# Patient Record
Sex: FEMALE | Race: WHITE | NOT HISPANIC OR LATINO | Employment: FULL TIME | URBAN - NONMETROPOLITAN AREA
[De-identification: names, ages, dates, MRNs, and addresses within clinical notes are randomized per-mention and may not be internally consistent; named-entity substitution may affect disease eponyms.]

---

## 2022-01-07 NOTE — PROGRESS NOTES
PT Evaluation     Today's date: 2022  Patient name: Dayan Hwang  : 1965  MRN: [id number]  Referring provider: Munir Santillan DO  Dx:   Encounter Diagnosis     ICD-10-CM    1  Unilateral primary osteoarthritis, right knee  M17 11                   Assessment  Assessment details: The patient is a 50 y/o female who presents to PT with diagnosis of R knee pain, PF OA, ITBS and hip girdle weakness  She has complaints of intermittent pain around her kneecap  Her knee ROM is WNL though she has pain at end range for flexion and extension  She demonstrates deficits with decreased strength, decreased flexibility, antalgic gait, decreased balance and proprioception, limited standing tolerance, difficulty with stair negotiation and pain with completing her ADLs and tasks at home  She remains I with all her ADLs though she has pain with completing them  More pain noted with increased time on her feet  She ambulates without AD with slow altaf and decreased weight shift to RLE in stance phase  Intermittent use of neoprene knee braces is noted  She has difficulty with stair negotiation, can go up the steps with reciprocal gait pattern but comes down the steps with non-reciprocal gait  TTP is noted around her patella  Secondary to pain and above deficits she is limited with her overall mobility and function  The patient would benefit from continued PT to address deficits and improve function  Tx to include ROM, stretching, strengthening, modalities, HEP, pt education, postural ed, lifting/body mechanics, neuro re-ed, balance/proprioception Te, MT and equipment        Impairments: abnormal gait, abnormal or restricted ROM, activity intolerance, impaired balance, impaired physical strength, lacks appropriate home exercise program, pain with function and weight-bearing intolerance  Other impairment: decreased flexibility  Functional limitations: difficulty with stair negotiationUnderstanding of Dx/Px/POC: good   Prognosis: good    Goals  STGs:  1  Initiate and complete HEP with verbal cues  2   Decrease R knee pain by > 25% in 4 weeks  3   Improve RLE strength by 1/2 grade in 4 weeks  LTGs:  1  Patient to be I with HEP in 8 weeks  2  Improve R knee ROM to 0-120 degrees with less pain at end range in 8 weeks to improve function  3  Improve RLE strength to 4+ to 5/5 t/o in 8 weeks to improve function  4  Decrease R knee pain to < or = to 1-2/10 with activity in 8 weeks to improve function  5   Patient to ambulate with normalized gait pattern in 8 weeks  6   Stair negotiation is improved to PLOF in 8 weeks  7   Recreational performance is improved to PLOF in 8 weeks  8   ADL performance is improved to PLOF in 8 weeks  9   Work performance is improved to maximal level of function in 8 weeks  Plan  Plan details: Modalities and therapy interventions prn  Patient would benefit from: skilled physical therapy  Planned modality interventions: cryotherapy, thermotherapy: hydrocollator packs, unattended electrical stimulation and ultrasound  Planned therapy interventions: manual therapy, balance, balance/weight bearing training, neuromuscular re-education, patient education, postural training, self care, strengthening, stretching, therapeutic activities, therapeutic exercise, flexibility, gait training and home exercise program  Frequency: 2x week  Duration in weeks: 8  Plan of Care beginning date: 1/12/2022  Plan of Care expiration date: 3/9/2022  Treatment plan discussed with: patient        Subjective Evaluation    History of Present Illness  Mechanism of injury: The patient states that the beginning of November when she stood up from a toilet her R knee "grabbed" her  Then later that night it did it again when she stood up from the couch  As time went on she notes that at times it felt like it would lock on her while walking and going up and down the steps    She had called her PCP - she had x-rays taken   Per patient they showed arthritis  She had then seen her doctor the beginning of December and he told her that her knees "looked okay"  She got a prescription for 800 mg Ibuprofen  She was referred to an orthopedic doctor  She had seen Dr Edson Moore on 22  No further imaging was completed  She notes that doctor had talked about injections but she did not have any done  She was referred to OPPT and she now presents for her evaluation  She will be going back to see the doctor in six weeks for her follow up appointment  She did have prior x-ray of LB and hip last , per patient there is OA in her lower back but hip x-ray was okay  Pain  At best pain ratin  At worst pain rating: 3  Location: R Knee - medial knee, around kneecap    Quality: sharp  Relieving factors: heat and ice  Aggravating factors: stair climbing and walking    Social Support  Steps to enter house: yes  Stairs in house: yes   Lives in: multiple-level home  Lives with: spouse    Employment status: working (Works Full time - [address] work - Sits most of day for work)    Diagnostic Tests  X-ray: abnormal  Patient Goals  Patient goals for therapy: increased motion, decreased pain and increased strength  Patient goal: "To help strengthen my knee and leg, to have less pain "          Objective     Tenderness     Right Knee   Tenderness in the inferior patella, lateral patella, medial patella and superior patella  No tenderness in the patellar tendon and quadriceps tendon  Neurological Testing     Sensation     Knee   Left Knee   Intact: light touch    Right Knee   Intact: light touch     Active Range of Motion   Left Knee   Flexion: 120 degrees   Extension: 0 degrees     Right Knee   Flexion: 120 degrees   Extension: 0 degrees     Additional Active Range of Motion Details  L SLR: 55  R SLR: 55    Pain at end range for R knee flexion and extension        Mobility   Patellar Mobility:     Right Knee   WFL: medial, lateral, superior and inferior    Additional Mobility Details  Pain with superior/inferior patella mobs    Strength/Myotome Testing     Left Knee   Flexion: WFL  Extension: WFL  Quadriceps contraction: good    Right Knee   Flexion: 4-  Extension: 4+  Quadriceps contraction: fair    Tests     Right Knee   Positive patella-femoral grind  Ambulation   Weight-Bearing Status   Assistive device used: none    Ambulation: Level Surfaces   Ambulation without assistive device: independent    Ambulation: Stairs   Ascend stairs: independent  Pattern: reciprocal  Descend stairs: independent  Pattern: non-reciprocal    Additional Stairs Ambulation Details  She has been coming down the steps sideways  Observational Gait   Decreased walking speed and right stance time  Precautions: None       Manuals 1/12       RLE                                Neuro Re-Ed         SLS        Tandem Stance        Sidestepping w/TBand        Monster Walks w/TBand        BOSU Lunges                        Ther Ex        NuStep        HR/TR        SLR x 3        TKE w/TBand        LAQ        Hams Curl w/TBand        QSets        Bridges        SLR        S/L Hip Abd        Clamshells                        Ther Activity        Stepups F/L        Stepdowns        Gait Training                        Modalities        HP/CP prn                           Access Code: [email or web address]  URL: [email or web address]  Date: 01/12/2022  Prepared by: Devika Beverage    Exercises  · Standing Hip Flexion AROM - 1 x daily - 7 x weekly - 1 sets - 10 reps  · Standing Hip Abduction AROM - 1 x daily - 7 x weekly - 1 sets - 10 reps  · Standing Hip Extension with Chair - 1 x daily - 7 x weekly - 1 sets - 10 reps  · Standing Heel Raise with Support - 1 x daily - 7 x weekly - 1 sets - 10 reps  · Seated Long Arc Quad - 1 x daily - 7 x weekly - 1 sets - 10 reps  · Supine Active Straight Leg Raise - 1 x daily - 7 x weekly - 1 sets - 10 reps  · Sidelying Hip Abduction - 1 x daily - 7 x weekly - 1 sets - 10 reps  · Supine Bridge - 1 x daily - 7 x weekly - 1 sets - 10 reps

## 2022-01-12 ENCOUNTER — EVALUATION (OUTPATIENT)
Dept: PHYSICAL THERAPY | Facility: CLINIC | Age: 57
End: 2022-01-12
Payer: COMMERCIAL

## 2022-01-12 DIAGNOSIS — M17.11 UNILATERAL PRIMARY OSTEOARTHRITIS, RIGHT KNEE: Primary | ICD-10-CM

## 2022-01-12 PROCEDURE — 97535 SELF CARE MNGMENT TRAINING: CPT | Performed by: PHYSICAL THERAPIST

## 2022-01-12 PROCEDURE — 97161 PT EVAL LOW COMPLEX 20 MIN: CPT | Performed by: PHYSICAL THERAPIST

## 2022-01-12 NOTE — LETTER
2022    [facility]    Patient: Sanjuana Evans   YOB: 1965   Date of Visit: 2022     Encounter Diagnosis     ICD-10-CM    1  Unilateral primary osteoarthritis, right knee  M17 11        Dear Dr Paulino Fix:    Thank you for your recent referral of Sanjuana Evans  Please review the attached evaluation summary from Nicolasa's recent visit  Please verify that you agree with the plan of care by signing the attached order  If you have any questions or concerns, please do not hesitate to call  I sincerely appreciate the opportunity to share in the care of one of your patients and hope to have another opportunity to work with you in the near future  Sincerely,    Darrion Carballo PT      Referring Provider:      I certify that I have read the below Plan of Care and certify the need for these services furnished under this plan of treatment while under my care  Constantinesapna Rubio DO  [address] [place] [address]  Via Fax: [phone]          PT Evaluation     Today's date: 2022  Patient name: Sanjuana Evans  : 1965  MRN: [id number]  Referring provider: Eddy Fitzpatrick DO  Dx:   Encounter Diagnosis     ICD-10-CM    1  Unilateral primary osteoarthritis, right knee  M17 11                   Assessment  Assessment details: The patient is a 50 y/o female who presents to PT with diagnosis of R knee pain, PF OA, ITBS and hip girdle weakness  She has complaints of intermittent pain around her kneecap  Her knee ROM is WNL though she has pain at end range for flexion and extension  She demonstrates deficits with decreased strength, decreased flexibility, antalgic gait, decreased balance and proprioception, limited standing tolerance, difficulty with stair negotiation and pain with completing her ADLs and tasks at home  She remains I with all her ADLs though she has pain with completing them    More pain noted with increased time on her feet  She ambulates without AD with slow altaf and decreased weight shift to RLE in stance phase  Intermittent use of neoprene knee braces is noted  She has difficulty with stair negotiation, can go up the steps with reciprocal gait pattern but comes down the steps with non-reciprocal gait  TTP is noted around her patella  Secondary to pain and above deficits she is limited with her overall mobility and function  The patient would benefit from continued PT to address deficits and improve function  Tx to include ROM, stretching, strengthening, modalities, HEP, pt education, postural ed, lifting/body mechanics, neuro re-ed, balance/proprioception Te, MT and equipment  Impairments: abnormal gait, abnormal or restricted ROM, activity intolerance, impaired balance, impaired physical strength, lacks appropriate home exercise program, pain with function and weight-bearing intolerance  Other impairment: decreased flexibility  Functional limitations: difficulty with stair negotiationUnderstanding of Dx/Px/POC: good   Prognosis: good    Goals  STGs:  1  Initiate and complete HEP with verbal cues  2   Decrease R knee pain by > 25% in 4 weeks  3   Improve RLE strength by 1/2 grade in 4 weeks  LTGs:  1  Patient to be I with HEP in 8 weeks  2  Improve R knee ROM to 0-120 degrees with less pain at end range in 8 weeks to improve function  3  Improve RLE strength to 4+ to 5/5 t/o in 8 weeks to improve function  4  Decrease R knee pain to < or = to 1-2/10 with activity in 8 weeks to improve function  5   Patient to ambulate with normalized gait pattern in 8 weeks  6   Stair negotiation is improved to PLOF in 8 weeks  7   Recreational performance is improved to PLOF in 8 weeks  8   ADL performance is improved to PLOF in 8 weeks  9   Work performance is improved to maximal level of function in 8 weeks  Plan  Plan details: Modalities and therapy interventions prn      Patient would benefit from: skilled physical therapy  Planned modality interventions: cryotherapy, thermotherapy: hydrocollator packs, unattended electrical stimulation and ultrasound  Planned therapy interventions: manual therapy, balance, balance/weight bearing training, neuromuscular re-education, patient education, postural training, self care, strengthening, stretching, therapeutic activities, therapeutic exercise, flexibility, gait training and home exercise program  Frequency: 2x week  Duration in weeks: 8  Plan of Care beginning date: 2022  Plan of Care expiration date: 3/9/2022  Treatment plan discussed with: patient        Subjective Evaluation    History of Present Illness  Mechanism of injury: The patient states that the beginning of November when she stood up from a toilet her R knee "grabbed" her  Then later that night it did it again when she stood up from the couch  As time went on she notes that at times it felt like it would lock on her while walking and going up and down the steps  She had called her PCP - she had x-rays taken  Per patient they showed arthritis  She had then seen her doctor the beginning of December and he told her that her knees "looked okay"  She got a prescription for 800 mg Ibuprofen  She was referred to an orthopedic doctor  She had seen Dr Chayito Vargas on 22  No further imaging was completed  She notes that doctor had talked about injections but she did not have any done  She was referred to OPPT and she now presents for her evaluation  She will be going back to see the doctor in six weeks for her follow up appointment  She did have prior x-ray of LB and hip last , per patient there is OA in her lower back but hip x-ray was okay      Pain  At best pain ratin  At worst pain rating: 3  Location: R Knee - medial knee, around kneecap    Quality: sharp  Relieving factors: heat and ice  Aggravating factors: stair climbing and walking    Social Support  Steps to enter house: yes  Stairs in house: yes   Lives in: multiple-level home  Lives with: spouse    Employment status: working (Works Full time - [address]  work - Sits most of day for work)    Diagnostic Tests  X-ray: abnormal  Patient Goals  Patient goals for therapy: increased motion, decreased pain and increased strength  Patient goal: "To help strengthen my knee and leg, to have less pain "          Objective     Tenderness     Right Knee   Tenderness in the inferior patella, lateral patella, medial patella and superior patella  No tenderness in the patellar tendon and quadriceps tendon  Neurological Testing     Sensation     Knee   Left Knee   Intact: light touch    Right Knee   Intact: light touch     Active Range of Motion   Left Knee   Flexion: 120 degrees   Extension: 0 degrees     Right Knee   Flexion: 120 degrees   Extension: 0 degrees     Additional Active Range of Motion Details  L SLR: 55  R SLR: 55    Pain at end range for R knee flexion and extension  Mobility   Patellar Mobility:     Right Knee   WFL: medial, lateral, superior and inferior    Additional Mobility Details  Pain with superior/inferior patella mobs    Strength/Myotome Testing     Left Knee   Flexion: WFL  Extension: WFL  Quadriceps contraction: good    Right Knee   Flexion: 4-  Extension: 4+  Quadriceps contraction: fair    Tests     Right Knee   Positive patella-femoral grind  Ambulation   Weight-Bearing Status   Assistive device used: none    Ambulation: Level Surfaces   Ambulation without assistive device: independent    Ambulation: Stairs   Ascend stairs: independent  Pattern: reciprocal  Descend stairs: independent  Pattern: non-reciprocal    Additional Stairs Ambulation Details  She has been coming down the steps sideways  Observational Gait   Decreased walking speed and right stance time                   Precautions: None       Manuals 1/12       RLE                                Neuro Re-Ed         SLS        Tandem Stance        Sidestepping w/TBand        [employer] w/TBand        BOSU Lunges                        Ther Ex        NuStep        HR/TR        SLR x 3        TKE w/TBand        LAQ        Hams Curl w/TBand        QSets        Bridges        SLR        S/L Hip Abd        Clamshells                        Ther Activity        Stepups F/L        Stepdowns        Gait Training                        Modalities        HP/CP prn                           Access Code: [email or web address]  URL: [email or web address]  Date: 01/12/2022  Prepared by: Hannah Moseley    Exercises  · Standing Hip Flexion AROM - 1 x daily - 7 x weekly - 1 sets - 10 reps  · Standing Hip Abduction AROM - 1 x daily - 7 x weekly - 1 sets - 10 reps  · Standing Hip Extension with Chair - 1 x daily - 7 x weekly - 1 sets - 10 reps  · Standing Heel Raise with Support - 1 x daily - 7 x weekly - 1 sets - 10 reps  · Seated Long Arc Quad - 1 x daily - 7 x weekly - 1 sets - 10 reps  · Supine Active Straight Leg Raise - 1 x daily - 7 x weekly - 1 sets - 10 reps  · Sidelying Hip Abduction - 1 x daily - 7 x weekly - 1 sets - 10 reps  · Supine Bridge - 1 x daily - 7 x weekly - 1 sets - 10 reps

## 2022-01-17 ENCOUNTER — APPOINTMENT (OUTPATIENT)
Dept: PHYSICAL THERAPY | Facility: CLINIC | Age: 57
End: 2022-01-17
Payer: COMMERCIAL

## 2022-01-19 ENCOUNTER — OFFICE VISIT (OUTPATIENT)
Dept: PHYSICAL THERAPY | Facility: CLINIC | Age: 57
End: 2022-01-19
Payer: COMMERCIAL

## 2022-01-19 DIAGNOSIS — M17.11 UNILATERAL PRIMARY OSTEOARTHRITIS, RIGHT KNEE: Primary | ICD-10-CM

## 2022-01-19 PROCEDURE — 97140 MANUAL THERAPY 1/> REGIONS: CPT

## 2022-01-19 PROCEDURE — 97112 NEUROMUSCULAR REEDUCATION: CPT

## 2022-01-19 PROCEDURE — 97014 ELECTRIC STIMULATION THERAPY: CPT

## 2022-01-19 PROCEDURE — 97110 THERAPEUTIC EXERCISES: CPT

## 2022-01-19 PROCEDURE — G0283 ELEC STIM OTHER THAN WOUND: HCPCS

## 2022-01-19 NOTE — PROGRESS NOTES
Daily Note     Today's date: 2022  Patient name: Tianna Nicole  : 1965  MRN: [id number]  Referring provider: Matheus Cooper DO  Dx:   Encounter Diagnosis     ICD-10-CM    1  Unilateral primary osteoarthritis, right knee  M17 11        Start Time: 1630  Stop Time: 1740  Total time in clinic (min): 70 minutes    Subjective: Pt continues with R knee tenderness to palpation  Objective: See treatment diary below  PT/PTA expanded pt's program per POC  Assessment: Tolerated treatment well  Patient exhibited good technique with therapeutic exercises      Plan: Continue per plan of care           Precautions: None       Manuals       RLE  Cd/ds              Neuro Re-Ed         SLS  3x 15"      Tandem Stance        Sidestepping w/TBand        Monster Walks w/TBand        BOSU Lunges        Ther Ex        NuStep  5m L3      HR  20x      SLR x 3        TKE w/TBand        LAQ  2/10      Hams Curl w/TBand        QSets  10x       Bridges        SLR  2/10      S/L Hip Abd        Clamshells  L4 2/10                      Ther Activity                        Gait Training                        Modalities        HP/IFC  15m

## 2022-01-24 ENCOUNTER — OFFICE VISIT (OUTPATIENT)
Dept: PHYSICAL THERAPY | Facility: CLINIC | Age: 57
End: 2022-01-24
Payer: COMMERCIAL

## 2022-01-24 DIAGNOSIS — M17.11 UNILATERAL PRIMARY OSTEOARTHRITIS, RIGHT KNEE: Primary | ICD-10-CM

## 2022-01-24 PROCEDURE — 97110 THERAPEUTIC EXERCISES: CPT

## 2022-01-24 PROCEDURE — G0283 ELEC STIM OTHER THAN WOUND: HCPCS

## 2022-01-24 PROCEDURE — 97014 ELECTRIC STIMULATION THERAPY: CPT

## 2022-01-24 PROCEDURE — 97140 MANUAL THERAPY 1/> REGIONS: CPT

## 2022-01-24 PROCEDURE — 97112 NEUROMUSCULAR REEDUCATION: CPT

## 2022-01-24 NOTE — PROGRESS NOTES
Daily Note     Today's date: 2022  Patient name: Sanjuana Evans  : 1965  MRN: [id number]  Referring provider: Eddy Fitzpatrick DO  Dx:   Encounter Diagnosis     ICD-10-CM    1  Unilateral primary osteoarthritis, right knee  M17 11        Start Time: 1630  Stop Time: 1745  Total time in clinic (min): 75 minutes    Subjective: Pt notes less discomfort with her R knee  Objective: See treatment diary below  Continues CP/Etim post tx  Assessment: Tolerated treatment well  Patient exhibited good technique with therapeutic exercises      Plan: Continue per plan of care           Precautions: None       Manuals      RLE  Cd/ds ds             Neuro Re-Ed         SLS  3x 15" 3x 15"     Tandem Stance        Sidestepping w/TBand        Monster Walks w/TBand        Ther Ex        NuStep  5m L3 10m L4     HR  20x 30x     SLR x 3   2/10ea     TKE w/TBand        LAQ  2/10 1 5# 2/10     Hams Curl w/TBand   L3 2/10     QSets  10x  20x 3"     Bridges c add   20x     SLR  2/10 -------->     Clamshells  L4 2/10 L4 2/10                     Ther Activity        Gait Training        Modalities        HP/IFC  15m 15m CP

## 2022-01-27 ENCOUNTER — OFFICE VISIT (OUTPATIENT)
Dept: PHYSICAL THERAPY | Facility: CLINIC | Age: 57
End: 2022-01-27
Payer: COMMERCIAL

## 2022-01-27 DIAGNOSIS — M17.11 UNILATERAL PRIMARY OSTEOARTHRITIS, RIGHT KNEE: Primary | ICD-10-CM

## 2022-01-27 PROCEDURE — 97110 THERAPEUTIC EXERCISES: CPT

## 2022-01-27 PROCEDURE — G0283 ELEC STIM OTHER THAN WOUND: HCPCS

## 2022-01-27 PROCEDURE — 97112 NEUROMUSCULAR REEDUCATION: CPT

## 2022-01-27 PROCEDURE — 97014 ELECTRIC STIMULATION THERAPY: CPT

## 2022-01-27 PROCEDURE — 97140 MANUAL THERAPY 1/> REGIONS: CPT

## 2022-01-27 NOTE — PROGRESS NOTES
Daily Note     Today's date: 2022  Patient name: Mari Hall  : 1965  MRN: [id number]  Referring provider: Selvin Lee DO  Dx:   Encounter Diagnosis     ICD-10-CM    1  Unilateral primary osteoarthritis, right knee  M17 11        Start Time: 1630  Stop Time: 1745  Total time in clinic (min): 75 minutes    Subjective: Pt notes variable level of soreness with activity      Objective: See treatment diary below      Assessment: Tolerated treatment well  Patient exhibited good technique with therapeutic exercises      Plan: Continue per plan of care           Precautions: None       Manuals     RLE  Cd/ds ds ds            Neuro Re-Ed         SLS  3x 15" 3x 15" 3x 15"    Tandem Stance        Sidestepping w/TBand        Monster Walks w/TBand        Ther Ex        NuStep  5m L3 10m L4 10m L5    HR  20x 30x 30x    SLR x 3   2/10ea 2/10    TKE w/TBand        LAQ  2/10 1 5# 2/10 1 5# 2/10    Hams Curl w/TBand   L3 2/10 L4 2/10    QSets  10x  20x 3" 30x 3"    Bridges c add   20x 2/15    SLR  2/10 --------> ----->    Clamshells  L4 2/10 L4 2/10 L4 2/10                    Ther Activity        Gait Training        Modalities        CP/IFC  15m 15m CP 15m

## 2022-02-02 ENCOUNTER — OFFICE VISIT (OUTPATIENT)
Dept: PHYSICAL THERAPY | Facility: CLINIC | Age: 57
End: 2022-02-02
Payer: COMMERCIAL

## 2022-02-02 DIAGNOSIS — M17.11 UNILATERAL PRIMARY OSTEOARTHRITIS, RIGHT KNEE: Primary | ICD-10-CM

## 2022-02-02 PROCEDURE — 97112 NEUROMUSCULAR REEDUCATION: CPT

## 2022-02-02 PROCEDURE — 97140 MANUAL THERAPY 1/> REGIONS: CPT

## 2022-02-02 PROCEDURE — 97110 THERAPEUTIC EXERCISES: CPT

## 2022-02-02 NOTE — PROGRESS NOTES
Daily Note     Today's date: 2022  Patient name: Emilio Conte  : 1965  MRN: [id number]  Referring provider: Ju Lomeli DO  Dx:   Encounter Diagnosis     ICD-10-CM    1  Unilateral primary osteoarthritis, right knee  M17 11        Start Time: 1630  Stop Time: 1730  Total time in clinic (min): 60 minutes    Subjective: Pt  notes overall less discomfort with her R knee  Objective: See treatment diary below  Trial cp only to access benefit of estim      Assessment: Tolerated treatment well  Patient exhibited good technique with therapeutic exercises      Plan: Continue per plan of care           Precautions: None       Manuals  2/   RLE  Cd/ds ds ds ds           Neuro Re-Ed         SLS  3x 15" 3x 15" 3x 15" 3x 15"   Tandem Stance        Sidestepping w/TBand        Monster Walks w/TBand        Ther Ex        NuStep  5m L3 10m L4 10m L5 10m L5   HR  20x 30x 30x 30x    SLR x 3   2/10ea 2/10 2/10   TKE w/TBand        LAQ  2/10 1 5# 2/10 1 5# 2/10 2# 2/10   Hams Curl w/TBand   L3 2/10 L4 2/10 L4 2/10   QSets  10x  20x 3" 30x 3" 30x 3"   Bridges c add   20x 2/15 /15   SLR  2/10 --------> ----->    Clamshells  L4 2/10 L4 2/10 L4 2/10 L5  2/10   SAQ     2# 2/10           Ther Activity        Gait Training        Modalities        CP/IFC  15m 15m CP 15m 15m CP

## 2022-02-07 ENCOUNTER — OFFICE VISIT (OUTPATIENT)
Dept: PHYSICAL THERAPY | Facility: CLINIC | Age: 57
End: 2022-02-07
Payer: COMMERCIAL

## 2022-02-07 DIAGNOSIS — M17.11 UNILATERAL PRIMARY OSTEOARTHRITIS, RIGHT KNEE: Primary | ICD-10-CM

## 2022-02-07 PROCEDURE — 97140 MANUAL THERAPY 1/> REGIONS: CPT

## 2022-02-07 PROCEDURE — 97112 NEUROMUSCULAR REEDUCATION: CPT

## 2022-02-07 PROCEDURE — 97110 THERAPEUTIC EXERCISES: CPT

## 2022-02-07 NOTE — PROGRESS NOTES
Daily Note     Today's date: 2022  Patient name: Myah Mariano  : 1965  MRN: [id number]  Referring provider: Milli Mayberry DO  Dx:   Encounter Diagnosis     ICD-10-CM    1  Unilateral primary osteoarthritis, right knee  M17 11        Start Time: 1630  Stop Time: 1730  Total time in clinic (min): 60 minutes    Subjective: Pt notes min difference with/without the Estim  She generally is sore 2 days after therapy   Objective: See treatment diary below  IFC PRN      Assessment: Tolerated treatment well  Patient exhibited good technique with therapeutic exercises      Plan: Continue per plan of care          Precautions: None        Manuals  2   RLE  ds Cd/ds ds ds ds                 Neuro Re-Ed              SLS  3x 15" 3x 15" 3x 15" 3x 15" 3x 15"   Tandem Stance             Sidestepping w/TBand             Monster Walks w/TBand             Ther Ex             NuStep  10m L5 5m L3 10m L4 10m L5 10m L5   HR 30x 20x 30x 30x 30x    SLR x 3  2/10   2/10ea 2/10 2/10   TKE w/TBand             LAQ  2#  2/10 1 5# 2/10 1 5# 2/10 2# 2/10   Hams Curl w/TBand  L4 2/10   L3 2/10 L4 2/10 L4 2/10   QSets  30x 3" 10x  20x 3" 30x 3" 30x 3"   Bridges c add  2/15   20x 2/15 15   SLR  -----> 2/10 --------> ----->     Supine hip Abd  L5 2/10 L4 2/10 L4 2/10 L4 2/10 L5  2/10   SAQ  2# 2/10       2# 2/10                 Ther Activity             Gait Training             Modalities             CP/IFC  15m CP 15m 15m CP 15m 15m CP

## 2022-02-10 ENCOUNTER — OFFICE VISIT (OUTPATIENT)
Dept: PHYSICAL THERAPY | Facility: CLINIC | Age: 57
End: 2022-02-10
Payer: COMMERCIAL

## 2022-02-10 DIAGNOSIS — M17.11 UNILATERAL PRIMARY OSTEOARTHRITIS, RIGHT KNEE: Primary | ICD-10-CM

## 2022-02-10 PROCEDURE — 97112 NEUROMUSCULAR REEDUCATION: CPT

## 2022-02-10 PROCEDURE — 97035 APP MDLTY 1+ULTRASOUND EA 15: CPT

## 2022-02-10 PROCEDURE — 97110 THERAPEUTIC EXERCISES: CPT

## 2022-02-10 PROCEDURE — 97140 MANUAL THERAPY 1/> REGIONS: CPT

## 2022-02-10 NOTE — PROGRESS NOTES
Daily Note     Today's date: 2/10/2022  Patient name: Keon Haley  : 1965  MRN: [id number]  Referring provider: Bj Sotomayor DO  Dx:   Encounter Diagnosis     ICD-10-CM    1  Unilateral primary osteoarthritis, right knee  M17 11        Start Time: 1630  Stop Time: 1740  Total time in clinic (min): 70 minutes    Subjective: Pt notes continued/variable R knee discomfort; she notes some sensation like it's going to lock  Objective: See treatment diary below  PTA applied US to the R knee medial knee per PT ok  Assessment: Tolerated treatment well  Patient exhibited good technique with therapeutic exercises      Plan: Continue per plan of care        Precautions: None        Manuals 2/7 2/10 1/24 1/27 2/2   RLE  ds ds ds ds ds                 Neuro Re-Ed              SLS  3x 15" 3x 15" 3x 15" 3x 15" 3x 15"   Tandem Stance             Sidestepping w/TBand             Monster Walks w/TBand             Ther Ex             NuStep  10m L5 10m L5 10m L4 10m L5 10m L5   HR 30x 30x 30x 30x 30x    SLR x 3  2/10  2/10 2/10ea 10 2/10   TKE w/TBand             LAQ  2# 2/10 2# 2/10 1 5# 2/10 1 5# 2/10 2# 2/10   Hams Curl w/TBand  L4 2/10  L4 2/10 L3 2/10 L4 2/10 L4 2/10   QSets  30x 3" 30x 3" 20x 3" 30x 3" 30x 3"   Bridges c add  2/15  2/15 20x 2/15 2/15   SLR  -----> 2# 2/10 --------> ----->     Supine hip Abd  L5 2/10 L5 2/10 L4 2/10 L4 2/10 L5  2/10   SAQ  2# 2/10  2# 2/10     2# 2/10                 Ther Activity             Gait Training             Modalities             CP/IFC  15m CP 15m CP 15m CP 15m 15m CP

## 2022-02-14 ENCOUNTER — EVALUATION (OUTPATIENT)
Dept: PHYSICAL THERAPY | Facility: CLINIC | Age: 57
End: 2022-02-14
Payer: COMMERCIAL

## 2022-02-14 DIAGNOSIS — M17.11 UNILATERAL PRIMARY OSTEOARTHRITIS, RIGHT KNEE: Primary | ICD-10-CM

## 2022-02-14 PROCEDURE — 97140 MANUAL THERAPY 1/> REGIONS: CPT

## 2022-02-14 PROCEDURE — 97110 THERAPEUTIC EXERCISES: CPT

## 2022-02-14 PROCEDURE — 97112 NEUROMUSCULAR REEDUCATION: CPT

## 2022-02-14 NOTE — PROGRESS NOTES
PT Re-Evaluation     Today's date: 2022  Patient name: Satish Murrieta  : 1965  MRN: [id number]  Referring provider: Abbie Pace DO  Dx:   Encounter Diagnosis     ICD-10-CM    1  Unilateral primary osteoarthritis, right knee  M17 11                   Assessment  Assessment details: The patient is a 48 y/o female seen for 8 since [facility] for PT with diagnosis of R knee pain, PF OA, ITBS and hip girdle weakness  She has responded well to PT intervention with improved strength and flexibility  She does continue to complain of intermittent pain around her right kneecap  Her knee ROM is WNL  She demonstrates deficits with decreased strength, decreased flexibility, antalgic gait, decreased balance and proprioception, limited standing tolerance, difficulty with stair negotiation and pain with completing her ADLs and tasks at home  She remains I with all her ADLs though she has pain with completing them  She has difficulty with stair negotiation, can go up the steps with reciprocal gait pattern but comes down the steps with non-reciprocal gait  TTP is noted around her patella  Secondary to pain and above deficits she is limited with her overall mobility and function  The patient would benefit from continued PT to address deficits and improve function  Tx to include ROM, stretching, strengthening, modalities, HEP, pt education, postural ed, lifting/body mechanics, neuro re-ed, balance/proprioception Te, MT and equipment  Impairments: abnormal gait, abnormal or restricted ROM, activity intolerance, impaired balance, impaired physical strength, lacks appropriate home exercise program, pain with function and weight-bearing intolerance  Other impairment: decreased flexibility  Functional limitations: difficulty with stair negotiationUnderstanding of Dx/Px/POC: good   Prognosis: good    Goals  STGs:  1  Initiate and complete HEP with verbal cues  -Met  2  Decrease R knee pain by > 25% in 4 weeks   - SPR 1/10 SPR  3  Improve RLE strength by 1/2 grade in 4 weeks  - progressing  LTGs:  1  Patient to be I with HEP in 8 weeks  2  Improve R knee ROM to 0-120 degrees with less pain at end range in 8 weeks to improve function  3  Improve RLE strength to 4+ to 5/5 t/o in 8 weeks to improve function  4  Decrease R knee pain to < or = to 1-2/10 with activity in 8 weeks to improve function  5   Patient to ambulate with normalized gait pattern in 8 weeks  6   Stair negotiation is improved to PLOF in 8 weeks  7   Recreational performance is improved to PLOF in 8 weeks  8   ADL performance is improved to PLOF in 8 weeks  9   Work performance is improved to maximal level of function in 8 weeks  Plan  Plan details: Modalities and therapy interventions prn  Patient would benefit from: skilled physical therapy  Planned modality interventions: cryotherapy, thermotherapy: hydrocollator packs, unattended electrical stimulation and ultrasound  Planned therapy interventions: manual therapy, balance, balance/weight bearing training, neuromuscular re-education, patient education, postural training, self care, strengthening, stretching, therapeutic activities, therapeutic exercise, flexibility, gait training and home exercise program  Frequency: 2x week  Duration in weeks: 4  Plan of Care beginning date: 2/14/2022  Plan of Care expiration date: 3/14/2022  Treatment plan discussed with: patient        Subjective Evaluation    History of Present Illness  Mechanism of injury: The patient states that the beginning of November when she stood up from a toilet her R knee "grabbed" her  Then later that night it did it again when she stood up from the couch  As time went on she notes that at times it felt like it would lock on her while walking and going up and down the steps  She had called her PCP - she had x-rays taken  Per patient they showed arthritis    She had then seen her doctor the beginning of December and he told her that her knees "looked okay"  She got a prescription for 800 mg Ibuprofen  She was referred to an orthopedic doctor  She had seen Dr Joslyn Galvez on 22  No further imaging was completed  She notes that doctor had talked about injections but she did not have any done  She was referred to OPPT and she now presents for her evaluation  She will be going back to see the doctor in six weeks for her follow up appointment  She did have prior x-ray of LB and hip last , per patient there is OA in her lower back but hip x-ray was okay  UPDATE 22:  Patient notes that soreness in the bilateral hips and knee persist however she does feel she has made improvement with PT intervention with less pain and stiffness in the right knee  Pain  At best pain ratin  At worst pain rating: 3  Location: R Knee - medial knee, around kneecap    Quality: sharp  Relieving factors: heat and ice  Aggravating factors: stair climbing and walking    Social Support  Steps to enter house: yes  Stairs in house: yes   Lives in: multiple-level home  Lives with: spouse    Employment status: working (Works Full time - [address] [place] [address] work - Sits most of day for work)    Diagnostic Tests  X-ray: abnormal  Patient Goals  Patient goals for therapy: increased motion, decreased pain and increased strength  Patient goal: "To help strengthen my knee and leg, to have less pain "          Objective     Tenderness     Right Knee   Tenderness in the inferior patella, lateral patella, medial patella and superior patella  No tenderness in the patellar tendon and quadriceps tendon       Neurological Testing     Sensation     Knee   Left Knee   Intact: light touch    Right Knee   Intact: light touch     Active Range of Motion   Left Knee   Flexion: 120 degrees   Extension: 0 degrees     Right Knee   Flexion: 120 degrees   Extension: 0 degrees     Additional Active Range of Motion Details  L SLR: 80  R SLR: 80  Pain at end range for R knee flexion and extension   - 2/14/22 no pain noted at enrange    Mobility   Patellar Mobility:     Right Knee   WFL: medial, lateral, superior and inferior    Additional Mobility Details  Pain with superior/inferior patella mobs    Strength/Myotome Testing     Left Knee   Flexion: WFL  Extension: WFL  Quadriceps contraction: good    Right Knee   Flexion: 4  Extension: 4+  Quadriceps contraction: good    Tests     Right Knee   Positive patella-femoral grind  Ambulation   Weight-Bearing Status   Assistive device used: none    Ambulation: Level Surfaces   Ambulation without assistive device: independent    Ambulation: Stairs   Ascend stairs: independent  Pattern: reciprocal  Descend stairs: independent  Pattern: non-reciprocal    Additional Stairs Ambulation Details  She has been coming down the steps sideways  Observational Gait   Decreased walking speed and right stance time  Precautions: None      Manuals 2/7 2/10 2/14 1/27 2/2   RLE  ds ds CD ds ds                 Neuro Re-Ed              SLS  3x 15" 3x 15" 3x 15" 3x 15" 3x 15"   Tandem Stance             Sidestepping w/TBand             Monster Walks w/TBand             Ther Ex             NuStep  10m L5 10m L5 10m L5 10m L5 10m L5   HR 30x 30x 30x 30x 30x    SLR x 3  2/10  2/10 2/10ea 2/10 2/10   TKE w/TBand             LAQ  2# 2/10 2# 2/10 #2 2/10 1 5# 2/10 2# 2/10   Hams Curl w/TBand  L4 2/10  L4 2/10 L4 2/10 L4 2/10 L4 2/10   QSets  30x 3" 30x 3" 30x 3" 30x 3" 30x 3"   Bridges c add  2/15  2/15 2/15 2/15 2/15   SLR  -----> 2# 2/10 2# 2/10 ----->     Supine hip Abd  L5 2/10 L5 2/10 L4 2/10 L4 2/10 L5  2/10   SAQ  2# 2/10  2# 2/10 2# 2/10   2# 2/10                 Ther Activity             Gait Training             Modalities             CP/IFC  15m CP 15m CP 15m CP 15m 15m CP                              Access Code: [email or web address]  URL: [email or web address]  Date: 01/12/2022  Prepared by: Stephanie Moran    Exercises  · Standing Hip Flexion AROM - 1 x daily - 7 x weekly - 1 sets - 10 reps  · Standing Hip Abduction AROM - 1 x daily - 7 x weekly - 1 sets - 10 reps  · Standing Hip Extension with Chair - 1 x daily - 7 x weekly - 1 sets - 10 reps  · Standing Heel Raise with Support - 1 x daily - 7 x weekly - 1 sets - 10 reps  · Seated Long Arc Quad - 1 x daily - 7 x weekly - 1 sets - 10 reps  · Supine Active Straight Leg Raise - 1 x daily - 7 x weekly - 1 sets - 10 reps  · Sidelying Hip Abduction - 1 x daily - 7 x weekly - 1 sets - 10 reps  · Supine Bridge - 1 x daily - 7 x weekly - 1 sets - 10 reps

## 2022-02-14 NOTE — LETTER
2022    Kevyn Foss DO  [address]    Patient: Shilpi Sahu   YOB: 1965   Date of Visit: 2022     Encounter Diagnosis     ICD-10-CM    1  Unilateral primary osteoarthritis, right knee  M17 11        Dear Dr Andy Siemens:    Thank you for your recent referral of Shilpi Sahu  Please review the attached evaluation summary from Nicolasa's recent visit  Please verify that you agree with the plan of care by signing the attached order  If you have any questions or concerns, please do not hesitate to call  I sincerely appreciate the opportunity to share in the care of one of your patients and hope to have another opportunity to work with you in the near future  Sincerely,    Shay Feldman, PT      Referring Provider:      I certify that I have read the below Plan of Care and certify the need for these services furnished under this plan of treatment while under my care  Kevyn Foss DO  [phone]  Via Fax: [phone]          PT Re-Evaluation     Today's date: 2022  Patient name: Shilpi Sahu  : 1965  MRN: [id number]  Referring provider: Kelly Estrada DO  Dx:   Encounter Diagnosis     ICD-10-CM    1  Unilateral primary osteoarthritis, right knee  M17 11                   Assessment  Assessment details: The patient is a 48 y/o female seen for 8 since [facility] for PT with diagnosis of R knee pain, PF OA, ITBS and hip girdle weakness  She has responded well to PT intervention with improved strength and flexibility  She does continue to complain of intermittent pain around her right kneecap  Her knee ROM is WNL  She demonstrates deficits with decreased strength, decreased flexibility, antalgic gait, decreased balance and proprioception, limited standing tolerance, difficulty with stair negotiation and pain with completing her ADLs and tasks at home    She remains I with all her ADLs though she has pain with completing them  She has difficulty with stair negotiation, can go up the steps with reciprocal gait pattern but comes down the steps with non-reciprocal gait  TTP is noted around her patella  Secondary to pain and above deficits she is limited with her overall mobility and function  The patient would benefit from continued PT to address deficits and improve function  Tx to include ROM, stretching, strengthening, modalities, HEP, pt education, postural ed, lifting/body mechanics, neuro re-ed, balance/proprioception Te, MT and equipment  Impairments: abnormal gait, abnormal or restricted ROM, activity intolerance, impaired balance, impaired physical strength, lacks appropriate home exercise program, pain with function and weight-bearing intolerance  Other impairment: decreased flexibility  Functional limitations: difficulty with stair negotiationUnderstanding of Dx/Px/POC: good   Prognosis: good    Goals  STGs:  1  Initiate and complete HEP with verbal cues  -Met  2  Decrease R knee pain by > 25% in 4 weeks  - SPR 1/10 SPR  3  Improve RLE strength by 1/2 grade in 4 weeks  - progressing  LTGs:  1  Patient to be I with HEP in 8 weeks  2  Improve R knee ROM to 0-120 degrees with less pain at end range in 8 weeks to improve function  3  Improve RLE strength to 4+ to 5/5 t/o in 8 weeks to improve function  4  Decrease R knee pain to < or = to 1-2/10 with activity in 8 weeks to improve function  5   Patient to ambulate with normalized gait pattern in 8 weeks  6   Stair negotiation is improved to PLOF in 8 weeks  7   Recreational performance is improved to PLOF in 8 weeks  8   ADL performance is improved to PLOF in 8 weeks  9   Work performance is improved to maximal level of function in 8 weeks  Plan  Plan details: Modalities and therapy interventions prn      Patient would benefit from: skilled physical therapy  Planned modality interventions: cryotherapy, thermotherapy: hydrocollator packs, unattended electrical stimulation and ultrasound  Planned therapy interventions: manual therapy, balance, balance/weight bearing training, neuromuscular re-education, patient education, postural training, self care, strengthening, stretching, therapeutic activities, therapeutic exercise, flexibility, gait training and home exercise program  Frequency: 2x week  Duration in weeks: 4  Plan of Care beginning date: 2022  Plan of Care expiration date: 3/14/2022  Treatment plan discussed with: patient        Subjective Evaluation    History of Present Illness  Mechanism of injury: The patient states that the beginning of November when she stood up from a toilet her R knee "grabbed" her  Then later that night it did it again when she stood up from the couch  As time went on she notes that at times it felt like it would lock on her while walking and going up and down the steps  She had called her PCP - she had x-rays taken  Per patient they showed arthritis  She had then seen her doctor the beginning of December and he told her that her knees "looked okay"  She got a prescription for 800 mg Ibuprofen  She was referred to an orthopedic doctor  She had seen Dr Suki Monge on 22  No further imaging was completed  She notes that doctor had talked about injections but she did not have any done  She was referred to OPPT and she now presents for her evaluation  She will be going back to see the doctor in six weeks for her follow up appointment  She did have prior x-ray of LB and hip last , per patient there is OA in her lower back but hip x-ray was okay  UPDATE 22:  Patient notes that soreness in the bilateral hips and knee persist however she does feel she has made improvement with PT intervention with less pain and stiffness in the right knee      Pain  At best pain ratin  At worst pain rating: 3  Location: R Knee - medial knee, around kneecap    Quality: sharp  Relieving factors: heat and ice  Aggravating factors: stair climbing and walking    Social Support  Steps to enter house: yes  Stairs in house: yes   Lives in: multiple-level home  Lives with: spouse    Employment status: working (Works Full time - [address] work - Sits most of day for work)    Diagnostic Tests  X-ray: abnormal  Patient Goals  Patient goals for therapy: increased motion, decreased pain and increased strength  Patient goal: "To help strengthen my knee and leg, to have less pain "          Objective     Tenderness     Right Knee   Tenderness in the inferior patella, lateral patella, medial patella and superior patella  No tenderness in the patellar tendon and quadriceps tendon  Neurological Testing     Sensation     Knee   Left Knee   Intact: light touch    Right Knee   Intact: light touch     Active Range of Motion   Left Knee   Flexion: 120 degrees   Extension: 0 degrees     Right Knee   Flexion: 120 degrees   Extension: 0 degrees     Additional Active Range of Motion Details  L SLR: 80  R SLR: 80  Pain at end range for R knee flexion and extension   - 2/14/22 no pain noted at enrange    Mobility   Patellar Mobility:     Right Knee   WFL: medial, lateral, superior and inferior    Additional Mobility Details  Pain with superior/inferior patella mobs    Strength/Myotome Testing     Left Knee   Flexion: WFL  Extension: WFL  Quadriceps contraction: good    Right Knee   Flexion: 4  Extension: 4+  Quadriceps contraction: good    Tests     Right Knee   Positive patella-femoral grind  Ambulation   Weight-Bearing Status   Assistive device used: none    Ambulation: Level Surfaces   Ambulation without assistive device: independent    Ambulation: Stairs   Ascend stairs: independent  Pattern: reciprocal  Descend stairs: independent  Pattern: non-reciprocal    Additional Stairs Ambulation Details  She has been coming down the steps sideways        Observational Gait   Decreased walking speed and right stance time  Precautions: None      Manuals 2/7 2/10 2/14 1/27 2/2   RLE  ds ds CD ds ds                 Neuro Re-Ed              SLS  3x 15" 3x 15" 3x 15" 3x 15" 3x 15"   Tandem Stance             Sidestepping w/TBand             Monster Walks w/TBand             Ther Ex             NuStep  10m L5 10m L5 10m L5 10m L5 10m L5   HR 30x 30x 30x 30x 30x    SLR x 3  2/10  2/10 2/10ea 2/10 2/10   TKE w/TBand             LAQ  2# 2/10 2# 2/10 #2 2/10 1 5# 2/10 2# 2/10   Hams Curl w/TBand  L4 2/10  L4 2/10 L4 2/10 L4 2/10 L4 2/10   QSets  30x 3" 30x 3" 30x 3" 30x 3" 30x 3"   Bridges c add  2/15  2/15 2/15 2/15 2/15   SLR  -----> 2# 2/10 2# 2/10 ----->     Supine hip Abd  L5 2/10 L5 2/10 L4 2/10 L4 2/10 L5  2/10   SAQ  2# 2/10  2# 2/10 2# 2/10   2# 2/10                 Ther Activity             Gait Training             Modalities             CP/IFC  15m CP 15m CP 15m CP 15m 15m CP                              Access Code: [email or web address]  URL: [email or web address]  Date: 01/12/2022  Prepared by: Vanessa Fu    Exercises  · Standing Hip Flexion AROM - 1 x daily - 7 x weekly - 1 sets - 10 reps  · Standing Hip Abduction AROM - 1 x daily - 7 x weekly - 1 sets - 10 reps  · Standing Hip Extension with Chair - 1 x daily - 7 x weekly - 1 sets - 10 reps  · Standing Heel Raise with Support - 1 x daily - 7 x weekly - 1 sets - 10 reps  · Seated Long Arc Quad - 1 x daily - 7 x weekly - 1 sets - 10 reps  · Supine Active Straight Leg Raise - 1 x daily - 7 x weekly - 1 sets - 10 reps  · Sidelying Hip Abduction - 1 x daily - 7 x weekly - 1 sets - 10 reps  · Supine Bridge - 1 x daily - 7 x weekly - 1 sets - 10 reps

## 2022-02-17 ENCOUNTER — OFFICE VISIT (OUTPATIENT)
Dept: PHYSICAL THERAPY | Facility: CLINIC | Age: 57
End: 2022-02-17
Payer: COMMERCIAL

## 2022-02-17 DIAGNOSIS — M17.11 UNILATERAL PRIMARY OSTEOARTHRITIS, RIGHT KNEE: Primary | ICD-10-CM

## 2022-02-17 PROCEDURE — 97110 THERAPEUTIC EXERCISES: CPT | Performed by: PHYSICAL THERAPIST

## 2022-02-17 PROCEDURE — 97140 MANUAL THERAPY 1/> REGIONS: CPT | Performed by: PHYSICAL THERAPIST

## 2022-02-17 NOTE — PROGRESS NOTES
Daily Note     Today's date: 2022  Patient name: Anayeli Odonnell  : 1965  MRN: [id number]  Referring provider: Pedro Pablo Milian DO  Dx:   Encounter Diagnosis     ICD-10-CM    1  Unilateral primary osteoarthritis, right knee  M17 11                   Subjective: The patient states that she had gone to see the doctor on Tuesday  She was given a script to continue with PT  She does not have to see the doctor for a follow up appointment, to see her as needed  Objective: See treatment diary below      Assessment: Tolerated treatment well  No increase in pain during session, just with knee feeling tight  Patient demonstrated fatigue post treatment and would benefit from continued PT  Continued PT would be beneficial to improve function  Plan: Continue per plan of care  Progress as able in upcoming visits            Precautions: None      Manuals 2/7 2/10 2/14 2/17 2   RLE  ds ds CD ML ds                 Neuro Re-Ed              SLS  3x 15" 3x 15" 3x 15" 3x 15" 3x 15"   Tandem Stance             Sidestepping w/TBand             Monster Walks w/TBand             Ther Ex             NuStep  10m L5 10m L5 10m L5 L5 10 mins 10m L5   HR 30x 30x 30x 30x 30x    SLR x 3  2/10  2/10 2/10ea 2/10 ea 2/10   TKE w/TBand             LAQ  2# 2/10 2# 2/10 #2 2/10 2# 2/10 2# 2/10   Hams Curl w/TBand  L4 2/10  L4 2/10 L4 2/10 L4 2/10 L4 2/10   QSets  30x 3" 30x 3" 30x 3" 30x 3" 30x 3"   Bridges c add  2/15  2/15 2/15 2/15 2/15   SLR  -----> 2# 2/10 2# 2/10 2# 2/10     Supine hip Abd  L5 2/10 L5 2/10 L4 2/10 L4 2/10 L5  2/10   SAQ  2# 2/10  2# 2/10 2# 2/10 2# 2/10 2# 2/10                 Ther Activity             Gait Training             Modalities             CP/IFC  15m CP 15m CP 15m CP 15 mins CP 15m CP

## 2022-02-21 ENCOUNTER — OFFICE VISIT (OUTPATIENT)
Dept: PHYSICAL THERAPY | Facility: CLINIC | Age: 57
End: 2022-02-21
Payer: COMMERCIAL

## 2022-02-21 DIAGNOSIS — M17.11 UNILATERAL PRIMARY OSTEOARTHRITIS, RIGHT KNEE: Primary | ICD-10-CM

## 2022-02-21 PROCEDURE — 97112 NEUROMUSCULAR REEDUCATION: CPT

## 2022-02-21 PROCEDURE — 97140 MANUAL THERAPY 1/> REGIONS: CPT

## 2022-02-21 PROCEDURE — 97110 THERAPEUTIC EXERCISES: CPT

## 2022-02-21 NOTE — PROGRESS NOTES
Daily Note     Today's date: 2022  Patient name: Lizzie Blackmon  : 1965  MRN: [id number]  Referring provider: Alnaa Glass DO  Dx:   Encounter Diagnosis     ICD-10-CM    1  Unilateral primary osteoarthritis, right knee  M17 11                   Subjective: Patient notes that she is doing well overall with no complaints this date  Objective: See treatment diary below      Assessment: Tolerated treatment well  Patient would benefit from continued PT to build strength  We did advance to use of the gym weight equipment for strengthening this date without issue  Patient continues to be eager to improve with function  Plan: Continue per plan of care           Precautions: None      Manuals 2/7 2/10 2/14 2/17 2/21   RLE  ds ds CD ML CD                 Neuro Re-Ed              SLS  3x 15" 3x 15" 3x 15" 3x 15" 3x 15"   Tandem Stance             Sidestepping w/TBand             Monster Walks w/TBand             Ther Ex             NuStep  10m L5 10m L5 10m L5 L5 10 mins 10m L5   HR 30x 30x 30x 30x 30x    SLR x 3  /10  2/10 2/10ea 2/10 ea 2/10   TKE w/TBand             LAQ  2# 2/10 2# 2/10 #2 2/10 2# 2/10 Leg ext  10# 1/10   Hams Curl w/TBand  L4 2/10  L4 2/10 L4 2/10 L4 2/10 Leg curl  30# 1/10   QSets  30x 3" 30x 3" 30x 3" 30x 3" 30x 3"   Bridges c add  2/15  2/15 2/15 2/15 2/15   SLR  -----> 2# 2/10 2# 2/10 2# 2/10  2# 2/10   Supine hip Abd  L5 2/10 L5 2/10 L4 2/10 L4 2/10 L5  2/10   SAQ  2# 2/10  2# 2/10 2# 2/10 2# 2/10 2# 2/10    Leg press         20# 1/10   Ther Activity             Gait Training             Modalities             CP/IFC  15m CP 15m CP 15m CP 15 mins CP 15m CP

## 2022-02-24 ENCOUNTER — OFFICE VISIT (OUTPATIENT)
Dept: PHYSICAL THERAPY | Facility: CLINIC | Age: 57
End: 2022-02-24
Payer: COMMERCIAL

## 2022-02-24 DIAGNOSIS — M17.11 UNILATERAL PRIMARY OSTEOARTHRITIS, RIGHT KNEE: Primary | ICD-10-CM

## 2022-02-24 PROCEDURE — 97140 MANUAL THERAPY 1/> REGIONS: CPT

## 2022-02-24 PROCEDURE — 97110 THERAPEUTIC EXERCISES: CPT

## 2022-02-24 PROCEDURE — 97112 NEUROMUSCULAR REEDUCATION: CPT

## 2022-02-24 NOTE — PROGRESS NOTES
Daily Note     Today's date: 2022  Patient name: Tyrell Kitchen  : 1965  MRN: [id number]  Referring provider: Cedric Jackson DO  Dx:   Encounter Diagnosis     ICD-10-CM    1  Unilateral primary osteoarthritis, right knee  M17 11        Start Time: 1630  Stop Time: 1730  Total time in clinic (min): 60 minutes    Subjective: Pt comments on increased home ADL's      Objective: See treatment diary below  Progressed program as able  Assessment: Tolerated treatment well  Patient exhibited good technique with therapeutic exercises      Plan: Continue per plan of care           Precautions: None      Manuals 2/24 2/10 2/14 2/17 2/21   RLE  ds ds CD ML CD                 Neuro Re-Ed              SLS  3x 15" 3x 15" 3x 15" 3x 15" 3x 15"   Tandem Stance             Sidestepping w/TBand             Monster Walks w/TBand             Ther Ex             NuStep  10m L5 10m L5 10m L5 L5 10 mins 10m L5   HR 30x 30x 30x 30x 30x    SLR x 3 10x  2/10 2/10ea 2/10 ea 2/10   TKE w/TBand             Leg Extension  10# 2/10 2# 2/10 #2 2/10 2# 2/10 Leg ext  10# 1/10   Hams Curl  35# 2/10  L4 2/10 L4 2/10 L4 2/10 Leg curl  30# 1/10   QSets  30x 3" 30x 3" 30x 3" 30x 3" 30x 3"   Bridges c add  2/15  2/15 2/15 2/15 2/15   SLR 2# 2/10 2# 2/10 2# 2/10 2# 2/10  2# 2/10   Supine hip Abd  L5 2/10 L5 2/10 L4 2/10 L4 2/10 L5  2/10   SAQ  2# 2/10  2# 2/10 2# 2/10 2# 2/10 2# 2/10    Leg press  20# 2/10       20# 1/10   Ther Activity             Gait Training             Modalities             CP/IFC  15m CP 15m CP 15m CP 15 mins CP 15m CP

## 2022-02-28 ENCOUNTER — OFFICE VISIT (OUTPATIENT)
Dept: PHYSICAL THERAPY | Facility: CLINIC | Age: 57
End: 2022-02-28
Payer: COMMERCIAL

## 2022-02-28 DIAGNOSIS — M17.11 UNILATERAL PRIMARY OSTEOARTHRITIS, RIGHT KNEE: Primary | ICD-10-CM

## 2022-02-28 PROCEDURE — 97140 MANUAL THERAPY 1/> REGIONS: CPT

## 2022-02-28 PROCEDURE — 97110 THERAPEUTIC EXERCISES: CPT

## 2022-02-28 PROCEDURE — 97112 NEUROMUSCULAR REEDUCATION: CPT

## 2022-02-28 NOTE — PROGRESS NOTES
Daily Note     Today's date: 2022  Patient name: Christopher Dong  : 1965  MRN: [id number]  Referring provider: Marla Caicedo DO  Dx:   Encounter Diagnosis     ICD-10-CM    1  Unilateral primary osteoarthritis, right knee  M17 11        Start Time: 1630  Stop Time: 1730  Total time in clinic (min): 60 minutes    Subjective: Pt notes min c/o  Objective: See treatment diary below  Assessment: Tolerated treatment well  Patient exhibited good technique with therapeutic exercises      Plan: Continue per plan of care           Precautions: None      Manuals    RLE  ds ds CD ML CD                 Neuro Re-Ed              SLS  3x 15" 3x 15" 3x 15" 3x 15" 3x 15"   Tandem Stance             Sidestepping w/TBand             Monster Walks w/TBand             Ther Ex             NuStep  10m L5 10m L5 10m L5 L5 10 mins 10m L5   HR 30x 30x 30x 30x 30x    SLR x 3 10x  2# 2/10 2/10ea 2/10 ea 2/10   Leg Extension  10# 2/10 10#   2/10 #2 2/10 2# 2/10 Leg ext  10# 1/10   Hams Curl  35# 2/10 35#   2/10 L4 2/10 L4 2/10 Leg curl  30# 1/10   QSets  30x 3" 30x 3" 30x 3" 30x 3" 30x 3"   Bridges c add  2/15  2/15 2/15 2/15 2/15   SLR 2# 2/10 2# 2/10 2# 2/10 2# 2/10  2# 2/10   Supine hip Abd  L5 2/10 L5 2/10 L4 2/10 L4 2/10 L5  2/10   SAQ  2# 2/10  2# 2/10 2# 2/10 2# 2/10 2# 2/10    Leg press  20# 2/10  20#   2/10     20# 1/10   Ther Activity             Gait Training             Modalities             CP/IFC  15m CP 15m   CP 15m CP 15 mins CP 15m CP

## 2022-03-02 ENCOUNTER — OFFICE VISIT (OUTPATIENT)
Dept: PHYSICAL THERAPY | Facility: CLINIC | Age: 57
End: 2022-03-02
Payer: COMMERCIAL

## 2022-03-02 DIAGNOSIS — M17.11 UNILATERAL PRIMARY OSTEOARTHRITIS, RIGHT KNEE: Primary | ICD-10-CM

## 2022-03-02 PROCEDURE — 97110 THERAPEUTIC EXERCISES: CPT

## 2022-03-02 PROCEDURE — 97112 NEUROMUSCULAR REEDUCATION: CPT

## 2022-03-02 PROCEDURE — 97140 MANUAL THERAPY 1/> REGIONS: CPT

## 2022-03-02 NOTE — PROGRESS NOTES
Daily Note     Today's date: 3/2/2022  Patient name: Sanjuana Evans  : 1965  MRN: [id number]  Referring provider: Eddy Fitzpatrick DO  Dx:   Encounter Diagnosis     ICD-10-CM    1  Unilateral primary osteoarthritis, right knee  M17 11        Start Time: 1630  Stop Time: 1730  Total time in clinic (min): 60 minutes    Subjective: Pt notes less R knee pain      Objective: See treatment diary below      Assessment:   Patient exhibited good technique with therapeutic exercises      Plan: Continue per plan of care           Precautions: None      Manuals 2/24 2/28 3/2 2/17 2/21   RLE  ds ds ds ML CD                 Neuro Re-Ed              SLS  3x 15" 3x 15" 3x 15" 3x 15" 3x 15"   Tandem Stance             Sidestepping w/TBand             Monster Walks w/TBand             Ther Ex             NuStep  10m L5 10m L5 10m L5 L5 10 mins 10m L5   HR 30x 30x 30x 30x 30x    SLR x 3 10x  2# 2/10 2# 2/10ea 2/10 ea 2/10   Leg Extension  10# 2/10 10#   2/10 10#  2/10 2# 2/10 Leg ext  10# 1/10   Hams Curl  35# 2/10 35#   2/10 35#  2/10 L4 2/10 Leg curl  30# 1/10   QSets  30x 3" 30x 3" 30x 3" 30x 3" 30x 3"   Bridges c add  2/15  2/15 2/15 2/15 2/15   SLR 2# 2/10 2# 2/10 2# 2/10 2# 2/10  2# 2/10   Supine hip Abd  L5 2/10 L5 2/10 L5 2/10 L4 2/10 L5  2/10   SAQ  2# 2/10  2# 2/10 2# 2/10 2# 2/10 2# 2/10    Leg press  20# 2/10  20#   2/10  20# 2/10   20# 1/10   Ther Activity             Gait Training             Modalities             CP  15m CP 15m   CP 15m  15 mins CP 15m CP

## 2022-03-03 ENCOUNTER — APPOINTMENT (OUTPATIENT)
Dept: PHYSICAL THERAPY | Facility: CLINIC | Age: 57
End: 2022-03-03
Payer: COMMERCIAL

## 2022-03-07 ENCOUNTER — OFFICE VISIT (OUTPATIENT)
Dept: PHYSICAL THERAPY | Facility: CLINIC | Age: 57
End: 2022-03-07
Payer: COMMERCIAL

## 2022-03-07 DIAGNOSIS — M17.11 UNILATERAL PRIMARY OSTEOARTHRITIS, RIGHT KNEE: Primary | ICD-10-CM

## 2022-03-07 PROCEDURE — 97112 NEUROMUSCULAR REEDUCATION: CPT

## 2022-03-07 PROCEDURE — 97140 MANUAL THERAPY 1/> REGIONS: CPT

## 2022-03-07 PROCEDURE — 97110 THERAPEUTIC EXERCISES: CPT

## 2022-03-07 NOTE — PROGRESS NOTES
Daily Note     Today's date: 3/7/2022  Patient name: Danielle Turcios  : 1965  MRN: [id number]  Referring provider: Brett Mtz DO  Dx:   Encounter Diagnosis     ICD-10-CM    1  Unilateral primary osteoarthritis, right knee  M17 11        Start Time: 1630  Stop Time: 1730  Total time in clinic (min): 60 minutes    Subjective: Pt comments on variable levels of discomfort with her R knee; she does feel stronger overall  Objective: See treatment diary below      Assessment: Tolerated treatment well  Patient exhibited good technique with therapeutic exercises      Plan: Continue per plan of care  Add Hip machine next visit          Precautions: None      Manuals 2/24 2/28 3/2 3/7 2/21   RLE  ds ds ds ds CD                 Neuro Re-Ed              SLS  3x 15" 3x 15" 3x 15" 3x 15" DC   Tandem Stance             Sidestepping w/TBand             Monster Walks w/TBand             Ther Ex             NuStep  10m L5 10m L5 10m L5 10m L5 10m L5   HR 30x 30x 30x 30x 30x    SLR x 3 10x  2# 2/10 2# 2/10ea 2# 2/10 ea DC   Leg Extension  10# 2/10 10#   2/10 10#  2/10 2# 2/10 Leg ext  10# 1/10   Hams Curl  35# 2/10 35#   2/10 35#  2/10 35# 2/10 Leg curl  30# 1/10   QSets  30x 3" 30x 3" 30x 3" 30x 3" 30x 3"   Bridges c add  2/15  2/15 2/15 2/15 2/15   SLR 2# 2/10 2# 2/10 2# 2/10 2# 2/10  2# 2/10   Supine hip Abd  L5 2/10 L5 2/10 L5 2/10 L4 2/10 L5  2/10   SAQ  2# 2/10  2# 2/10 2# 2/10 2# 2/10 2# 2/10    Leg press  20# 2/10  20# 2/10  20# 2/10  20# 2/10 20# 1/10   Ther Activity             Gait Training             Modalities             CP  15m  15m 15m  15 m 15m

## 2022-03-10 ENCOUNTER — APPOINTMENT (OUTPATIENT)
Dept: PHYSICAL THERAPY | Facility: CLINIC | Age: 57
End: 2022-03-10
Payer: COMMERCIAL

## 2022-03-14 ENCOUNTER — EVALUATION (OUTPATIENT)
Dept: PHYSICAL THERAPY | Facility: CLINIC | Age: 57
End: 2022-03-14
Payer: COMMERCIAL

## 2022-03-14 DIAGNOSIS — M17.11 UNILATERAL PRIMARY OSTEOARTHRITIS, RIGHT KNEE: Primary | ICD-10-CM

## 2022-03-14 PROCEDURE — 97112 NEUROMUSCULAR REEDUCATION: CPT

## 2022-03-14 PROCEDURE — 97110 THERAPEUTIC EXERCISES: CPT

## 2022-03-14 NOTE — PROGRESS NOTES
PT Re-Evaluation     Today's date: 3/14/2022  Patient name: Medhat Urrutia  : 1965  MRN: [id number]  Referring provider: Quita Guajardo DO  Dx:   Encounter Diagnosis     ICD-10-CM    1  Unilateral primary osteoarthritis, right knee  M17 11                   Assessment  Assessment details: The patient is a 50 y/o female seen for 15 since [facility] for PT with diagnosis of R knee pain, PF OA, ITBS and hip girdle weakness  Patient continues to respond well to PT intervention with decreasing pain complaints  She is able to  She has responded well to PT intervention with improved strength and flexibility  She does have increased pain following kneeling activity over the weekend  She does continue to complain of intermittent pain around her right kneecap  Her knee ROM is WNL  Patient notes that she is better able to tolerate use of stairs at this time  She has advance to a strengthening program in the gym with the leg press, leg curl, leg extension and multi hip machine  The patient would benefit from continued PT to address deficits and improve function  Tx to include ROM, stretching, strengthening, modalities, HEP, pt education, postural ed, lifting/body mechanics, neuro re-ed, balance/proprioception Te, MT and equipment  Impairments: abnormal gait, abnormal or restricted ROM, activity intolerance, impaired balance, impaired physical strength, lacks appropriate home exercise program, pain with function and weight-bearing intolerance  Other impairment: decreased flexibility  Functional limitations: difficulty with stair negotiationUnderstanding of Dx/Px/POC: good   Prognosis: good    Goals  STGs:  1  Initiate and complete HEP with verbal cues  -Met  2  Decrease R knee pain by > 25% in 4 weeks  - SPR 3-4/10 SPR  3  Improve RLE strength by 1/2 grade in 4 weeks  - met  LTGs:  1  Patient to be I with HEP in 8 weeks    2  Improve R knee ROM to 0-120 degrees with less pain at end range in 8 weeks to improve function  -met  3  Improve RLE strength to 4+ to 5/5 t/o in 8 weeks to improve function  - 4+/5 this date  4  Decrease R knee pain to < or = to 1-2/10 with activity in 8 weeks to improve function  - SPR 3-4/10 "uncormfortable"  5  Patient to ambulate with normalized gait pattern in 8 weeks  - met  6  Stair negotiation is improved to PLOF in 8 weeks  - met - now descending with reciprocal pattern  7  Recreational performance is improved to PLOF in 8 weeks  - progressing  8  ADL performance is improved to PLOF in 8 weeks  - progressing  9  Work performance is improved to maximal level of function in 8 weeks  - progressing      Plan  Plan details: Modalities and therapy interventions prn  Patient would benefit from: skilled physical therapy  Planned modality interventions: cryotherapy, thermotherapy: hydrocollator packs, unattended electrical stimulation and ultrasound  Planned therapy interventions: manual therapy, balance, balance/weight bearing training, neuromuscular re-education, patient education, postural training, self care, strengthening, stretching, therapeutic activities, therapeutic exercise, flexibility, gait training and home exercise program  Frequency: 2x week  Duration in weeks: 4  Plan of Care beginning date: 3/14/2022  Plan of Care expiration date: 4/15/2022  Treatment plan discussed with: patient        Subjective Evaluation    History of Present Illness  Mechanism of injury: The patient states that the beginning of November when she stood up from a toilet her R knee "grabbed" her  Then later that night it did it again when she stood up from the couch  As time went on she notes that at times it felt like it would lock on her while walking and going up and down the steps  She had called her PCP - she had x-rays taken  Per patient they showed arthritis  She had then seen her doctor the beginning of December and he told her that her knees "looked okay"    She got a prescription for 800 mg Ibuprofen  She was referred to an orthopedic doctor  She had seen Dr Mariah Ruff on 22  No further imaging was completed  She notes that doctor had talked about injections but she did not have any done  She was referred to OPPT and she now presents for her evaluation  She will be going back to see the doctor in six weeks for her follow up appointment  She did have prior x-ray of LB and hip last , per patient there is OA in her lower back but hip x-ray was okay  UPDATE 22:  Patient notes that soreness in the bilateral hips and knee persist however she does feel she has made improvement with PT intervention with less pain and stiffness in the right knee  UPDATE 3/14/22:  Patient notes that while she is feeling better overall she does have increased right knee pain which she feels may be due to kneeling for a short period of time  She notes that she is compliant with her HEP but feels it is too easy at this time  Pain  At best pain ratin  At worst pain rating: 3  Location: R Knee - medial knee, around kneecap    Quality: sharp  Relieving factors: heat and ice  Aggravating factors: stair climbing and walking    Social Support  Steps to enter house: yes  Stairs in house: yes   Lives in: multiple-level home  Lives with: spouse    Employment status: working (Works Full time - [address] work - Sits most of day for work)    Diagnostic Tests  X-ray: abnormal  Patient Goals  Patient goals for therapy: increased motion, decreased pain and increased strength  Patient goal: "To help strengthen my knee and leg, to have less pain "          Objective     Tenderness     Right Knee   Tenderness in the inferior patella, ITB, lateral patella, medial patella and superior patella  No tenderness in the patellar tendon and quadriceps tendon       Additional Tenderness Details  Right ITB tenderness this date    Neurological Testing     Sensation     Knee   Left Knee   Intact: light touch    Right Knee Intact: light touch     Active Range of Motion   Left Knee   Flexion: 124 degrees   Extension: 0 degrees     Right Knee   Flexion: 126 degrees   Extension: 0 degrees     Additional Active Range of Motion Details  L SLR: 80  R SLR: 80  Pain at end range for R knee flexion and extension   - 2/14/22 no pain noted at enrange    Mobility   Patellar Mobility:     Right Knee   WFL: medial, lateral, superior and inferior    Additional Mobility Details  Pain with superior/inferior patella mobs    Strength/Myotome Testing     Left Knee   Flexion: WFL  Extension: WFL  Quadriceps contraction: good    Right Knee   Flexion: 4+  Extension: 4+  Quadriceps contraction: good    Tests     Right Knee   Positive patella-femoral grind  Ambulation   Weight-Bearing Status   Assistive device used: none    Ambulation: Level Surfaces   Ambulation without assistive device: independent    Ambulation: Stairs   Ascend stairs: independent  Pattern: reciprocal  Descend stairs: independent  Pattern: non-reciprocal    Additional Stairs Ambulation Details  She has been coming down the steps sideways  Observational Gait   Decreased walking speed and right stance time                   Precautions: None       Manuals 2/24 2/28 3/2 3/7 3/14   RLE  ds ds ds ds CD                 Neuro Re-Ed              SLS  3x 15" 3x 15" 3x 15" 3x 15" 3x 15 sec   Tandem Stance             Sidestepping w/TBand             Monster Walks w/TBand             Ther Ex             NuStep  10m L5 10m L5 10m L5 10m L5 10m L5   HR 30x 30x 30x 30x 30x    SLR x 3 10x  2# 2/10 2# 2/10ea 2# 2/10 ea DC   Leg Extension  10# 2/10 10#   2/10 10#  2/10 2# 2/10 Leg ext  10# 1/10   Hams Curl  35# 2/10 35#   2/10 35#  2/10 35# 2/10 Leg curl  30# 1/10   QSets  30x 3" 30x 3" 30x 3" 30x 3" 30x 3"   Bridges c add  2/15  2/15 2/15 2/15 2/15   SLR 2# 2/10 2# 2/10 2# 2/10 2# 2/10  2# 2/10   Supine hip Abd  L5 2/10 L5 2/10 L5 2/10 L4 2/10 L5  2/10   SAQ  2# 2/10  2# 2/10 2# 2/10 2# 2/10 2# 2/10    Leg press  20# 2/10  20# 2/10  20# 2/10  20# 2/10 20# 1/10   Ther Activity             Gait Training             Modalities             CP  15m  15m 15m  15 m 15m                               Access Code: [email or web address]  URL: [email or web address]  Date: 01/12/2022  Prepared by: Stephanie Moran    Exercises  · Standing Hip Flexion AROM - 1 x daily - 7 x weekly - 1 sets - 10 reps  · Standing Hip Abduction AROM - 1 x daily - 7 x weekly - 1 sets - 10 reps  · Standing Hip Extension with Chair - 1 x daily - 7 x weekly - 1 sets - 10 reps  · Standing Heel Raise with Support - 1 x daily - 7 x weekly - 1 sets - 10 reps  · Seated Long Arc Quad - 1 x daily - 7 x weekly - 1 sets - 10 reps  · Supine Active Straight Leg Raise - 1 x daily - 7 x weekly - 1 sets - 10 reps  · Sidelying Hip Abduction - 1 x daily - 7 x weekly - 1 sets - 10 reps  · Supine Bridge - 1 x daily - 7 x weekly - 1 sets - 10 reps

## 2022-03-14 NOTE — LETTER
2022    Kristina Rowe DO  [facility]    Patient: Danielle Turcios   YOB: 1965   Date of Visit: 3/14/2022     Encounter Diagnosis     ICD-10-CM    1  Unilateral primary osteoarthritis, right knee  M17 11        Dear Dr Sandhu Para:    Thank you for your recent referral of Danielle Turcios  Please review the attached evaluation summary from Nicolasa's recent visit  Please verify that you agree with the plan of care by signing the attached order  If you have any questions or concerns, please do not hesitate to call  I sincerely appreciate the opportunity to share in the care of one of your patients and hope to have another opportunity to work with you in the near future  Sincerely,    Poncho Genao, PT      Referring Provider:      I certify that I have read the below Plan of Care and certify the need for these services furnished under this plan of treatment while under my care  Kristina Rowe DO  [address] [place] [address]  Via Fax: [phone]          PT Re-Evaluation     Today's date: 3/14/2022  Patient name: Danielle Turcios  : 1965  MRN: [id number]  Referring provider: Brett Mtz DO  Dx:   Encounter Diagnosis     ICD-10-CM    1  Unilateral primary osteoarthritis, right knee  M17 11                   Assessment  Assessment details: The patient is a 48 y/o female seen for 15 since [facility] for PT with diagnosis of R knee pain, PF OA, ITBS and hip girdle weakness  Patient continues to respond well to PT intervention with decreasing pain complaints  She is able to  She has responded well to PT intervention with improved strength and flexibility  She does have increased pain following kneeling activity over the weekend  She does continue to complain of intermittent pain around her right kneecap  Her knee ROM is WNL  Patient notes that she is better able to tolerate use of stairs at this time   She has advance to a strengthening program in the gym with the leg press, leg curl, leg extension and multi hip machine  The patient would benefit from continued PT to address deficits and improve function  Tx to include ROM, stretching, strengthening, modalities, HEP, pt education, postural ed, lifting/body mechanics, neuro re-ed, balance/proprioception Te, MT and equipment  Impairments: abnormal gait, abnormal or restricted ROM, activity intolerance, impaired balance, impaired physical strength, lacks appropriate home exercise program, pain with function and weight-bearing intolerance  Other impairment: decreased flexibility  Functional limitations: difficulty with stair negotiationUnderstanding of Dx/Px/POC: good   Prognosis: good    Goals  STGs:  1  Initiate and complete HEP with verbal cues  -Met  2  Decrease R knee pain by > 25% in 4 weeks  - SPR 3-4/10 SPR  3  Improve RLE strength by 1/2 grade in 4 weeks  - met  LTGs:  1  Patient to be I with HEP in 8 weeks  2  Improve R knee ROM to 0-120 degrees with less pain at end range in 8 weeks to improve function  -met  3  Improve RLE strength to 4+ to 5/5 t/o in 8 weeks to improve function  - 4+/5 this date  4  Decrease R knee pain to < or = to 1-2/10 with activity in 8 weeks to improve function  - SPR 3-4/10 "uncormfortable"  5  Patient to ambulate with normalized gait pattern in 8 weeks  - met  6  Stair negotiation is improved to PLOF in 8 weeks  - met - now descending with reciprocal pattern  7  Recreational performance is improved to PLOF in 8 weeks  - progressing  8  ADL performance is improved to PLOF in 8 weeks  - progressing  9  Work performance is improved to maximal level of function in 8 weeks  - progressing      Plan  Plan details: Modalities and therapy interventions prn      Patient would benefit from: skilled physical therapy  Planned modality interventions: cryotherapy, thermotherapy: hydrocollator packs, unattended electrical stimulation and ultrasound  Planned therapy interventions: manual therapy, balance, balance/weight bearing training, neuromuscular re-education, patient education, postural training, self care, strengthening, stretching, therapeutic activities, therapeutic exercise, flexibility, gait training and home exercise program  Frequency: 2x week  Duration in weeks: 4  Plan of Care beginning date: 3/14/2022  Plan of Care expiration date: 4/15/2022  Treatment plan discussed with: patient        Subjective Evaluation    History of Present Illness  Mechanism of injury: The patient states that the beginning of November when she stood up from a toilet her R knee "grabbed" her  Then later that night it did it again when she stood up from the couch  As time went on she notes that at times it felt like it would lock on her while walking and going up and down the steps  She had called her PCP - she had x-rays taken  Per patient they showed arthritis  She had then seen her doctor the beginning of December and he told her that her knees "looked okay"  She got a prescription for 800 mg Ibuprofen  She was referred to an orthopedic doctor  She had seen Dr Bobby Manzano on 1/4/22  No further imaging was completed  She notes that doctor had talked about injections but she did not have any done  She was referred to OPPT and she now presents for her evaluation  She will be going back to see the doctor in six weeks for her follow up appointment  She did have prior x-ray of LB and hip last June, per patient there is OA in her lower back but hip x-ray was okay  UPDATE 2/14/22:  Patient notes that soreness in the bilateral hips and knee persist however she does feel she has made improvement with PT intervention with less pain and stiffness in the right knee  UPDATE 3/14/22:  Patient notes that while she is feeling better overall she does have increased right knee pain which she feels may be due to kneeling for a short period of time   She notes that she is compliant with her HEP but feels it is too easy at this time  Pain  At best pain ratin  At worst pain rating: 3  Location: R Knee - medial knee, around kneecap    Quality: sharp  Relieving factors: heat and ice  Aggravating factors: stair climbing and walking    Social Support  Steps to enter house: yes  Stairs in house: yes   Lives in: multiple-level home  Lives with: spouse    Employment status: working (Works Full time - [address] work - Sits most of day for work)    Diagnostic Tests  X-ray: abnormal  Patient Goals  Patient goals for therapy: increased motion, decreased pain and increased strength  Patient goal: "To help strengthen my knee and leg, to have less pain "          Objective     Tenderness     Right Knee   Tenderness in the inferior patella, ITB, lateral patella, medial patella and superior patella  No tenderness in the patellar tendon and quadriceps tendon  Additional Tenderness Details  Right ITB tenderness this date    Neurological Testing     Sensation     Knee   Left Knee   Intact: light touch    Right Knee   Intact: light touch     Active Range of Motion   Left Knee   Flexion: 124 degrees   Extension: 0 degrees     Right Knee   Flexion: 126 degrees   Extension: 0 degrees     Additional Active Range of Motion Details  L SLR: 80  R SLR: 80  Pain at end range for R knee flexion and extension   - 22 no pain noted at enrange    Mobility   Patellar Mobility:     Right Knee   WFL: medial, lateral, superior and inferior    Additional Mobility Details  Pain with superior/inferior patella mobs    Strength/Myotome Testing     Left Knee   Flexion: WFL  Extension: WFL  Quadriceps contraction: good    Right Knee   Flexion: 4+  Extension: 4+  Quadriceps contraction: good    Tests     Right Knee   Positive patella-femoral grind       Ambulation   Weight-Bearing Status   Assistive device used: none    Ambulation: Level Surfaces   Ambulation without assistive device: independent    Ambulation: Stairs   Ascend stairs: independent  Pattern: reciprocal  Descend stairs: independent  Pattern: non-reciprocal    Additional Stairs Ambulation Details  She has been coming down the steps sideways  Observational Gait   Decreased walking speed and right stance time  Precautions: None       Manuals 2/24 2/28 3/2 3/7 3/14   RLE  ds ds ds ds CD                 Neuro Re-Ed              SLS  3x 15" 3x 15" 3x 15" 3x 15" 3x 15 sec   Tandem Stance             Sidestepping w/TBand             Monster Walks w/TBand             Ther Ex             NuStep  10m L5 10m L5 10m L5 10m L5 10m L5   HR 30x 30x 30x 30x 30x    SLR x 3 10x  2# 2/10 2# 2/10ea 2# 2/10 ea DC   Leg Extension  10# 2/10 10#   2/10 10#  2/10 2# 2/10 Leg ext  10# 1/10   Hams Curl  35# 2/10 35#   2/10 35#  2/10 35# 2/10 Leg curl  30# 1/10   QSets  30x 3" 30x 3" 30x 3" 30x 3" 30x 3"   Bridges c add  2/15  2/15 2/15 2/15 2/15   SLR 2# 2/10 2# 2/10 2# 2/10 2# 2/10  2# 2/10   Supine hip Abd  L5 2/10 L5 2/10 L5 2/10 L4 2/10 L5  2/10   SAQ  2# 2/10  2# 2/10 2# 2/10 2# 2/10 2# 2/10    Leg press  20# 2/10  20# 2/10  20# 2/10  20# 2/10 20# 1/10   Ther Activity             Gait Training             Modalities             CP  15m  15m 15m  15 m 15m                               Access Code: [email or web address]  URL: [email or web address]  Date: 01/12/2022  Prepared by: Mariia Gutierrez    Exercises  · Standing Hip Flexion AROM - 1 x daily - 7 x weekly - 1 sets - 10 reps  · Standing Hip Abduction AROM - 1 x daily - 7 x weekly - 1 sets - 10 reps  · Standing Hip Extension with Chair - 1 x daily - 7 x weekly - 1 sets - 10 reps  · Standing Heel Raise with Support - 1 x daily - 7 x weekly - 1 sets - 10 reps  · Seated Long Arc Quad - 1 x daily - 7 x weekly - 1 sets - 10 reps  · Supine Active Straight Leg Raise - 1 x daily - 7 x weekly - 1 sets - 10 reps  · Sidelying Hip Abduction - 1 x daily - 7 x weekly - 1 sets - 10 reps  · Supine Bridge - 1 x daily - 7 x weekly - 1 sets - 10 reps

## 2022-03-17 ENCOUNTER — OFFICE VISIT (OUTPATIENT)
Dept: PHYSICAL THERAPY | Facility: CLINIC | Age: 57
End: 2022-03-17
Payer: COMMERCIAL

## 2022-03-17 DIAGNOSIS — M17.11 UNILATERAL PRIMARY OSTEOARTHRITIS, RIGHT KNEE: Primary | ICD-10-CM

## 2022-03-17 PROCEDURE — 97110 THERAPEUTIC EXERCISES: CPT

## 2022-03-17 PROCEDURE — 97112 NEUROMUSCULAR REEDUCATION: CPT

## 2022-03-17 PROCEDURE — 97140 MANUAL THERAPY 1/> REGIONS: CPT

## 2022-03-17 NOTE — PROGRESS NOTES
Daily Note     Today's date: 3/17/2022  Patient name: Anayeli Odonnell  : 1965  MRN: [id number]  Referring provider: Pedro Pablo Milian DO  Dx:   Encounter Diagnosis     ICD-10-CM    1  Unilateral primary osteoarthritis, right knee  M17 11        Start Time: 1630  Stop Time: 1730  Total time in clinic (min): 60 minutes    Subjective:Pt notes variable knee discomfort      Objective: See treatment diary below      Assessment: Tolerated treatment well  Patient exhibited good technique with therapeutic exercises      Plan: Continue per plan of care           Precautions: None       Manuals 3/17 2/28 3/2 3/7 3/14   RLE  ds ds ds ds CD                 Neuro Re-Ed              SLS  3x 15" 3x 15" 3x 15" 3x 15" 3x 15 sec   Tandem Stance             Sidestepping w/TBand             Monster Walks w/TBand             Ther Ex             NuStep  10m L5 10m L5 10m L5 10m L5 10m L5   HR 30x 30x 30x 30x 30x    Leg Extension  10# 2/10 10#   2/10 10#  2/10 10# 2/10 Leg ext  10# 1/10   Hams Curl  35# 2/10 35#   2/10 35#  2/10 35# 2/10 Leg curl  30# 1/10   QSets  30x 3" 30x 3" 30x 3" 30x 3" 30x 3"   Bridges c add  2/15  2/15 2/15 2/15 2/15   SLR 2# 2/10 2# 2/10 2# 2/10 2# 2/10  2# 2/10   Supine hip Abd  L5 2/10 L5 2/10 L5 2/10 L4 2/10 L5  2/10   SAQ DC  2# 2/10 2# 2/10 2# 2/10 2# 2/10    Leg press  20# 2/10  20# 2/10  20# 2/10  20# 2/10 20# 1/10   Ther Activity             Gait Training             Modalities             CP  15m  15m 15m  15 m 15m

## 2022-03-21 ENCOUNTER — APPOINTMENT (OUTPATIENT)
Dept: PHYSICAL THERAPY | Facility: CLINIC | Age: 57
End: 2022-03-21
Payer: COMMERCIAL

## 2022-03-24 ENCOUNTER — OFFICE VISIT (OUTPATIENT)
Dept: PHYSICAL THERAPY | Facility: CLINIC | Age: 57
End: 2022-03-24
Payer: COMMERCIAL

## 2022-03-24 DIAGNOSIS — M17.11 UNILATERAL PRIMARY OSTEOARTHRITIS, RIGHT KNEE: Primary | ICD-10-CM

## 2022-03-24 PROCEDURE — 97112 NEUROMUSCULAR REEDUCATION: CPT

## 2022-03-24 PROCEDURE — 97140 MANUAL THERAPY 1/> REGIONS: CPT

## 2022-03-24 PROCEDURE — 97110 THERAPEUTIC EXERCISES: CPT

## 2022-03-24 NOTE — PROGRESS NOTES
Daily Note     Today's date: 3/24/2022  Patient name: Keon Haley  : 1965  MRN: [id number]  Referring provider: Bj Sotomayor DO  Dx:   Encounter Diagnosis     ICD-10-CM    1  Unilateral primary osteoarthritis, right knee  M17 11        Start Time: 1630  Stop Time: 1730  Total time in clinic (min): 60 minutes    Subjective: Pt notes improvement in her sx after attempting tpr technique( instructed by PTA last visit)      Objective: See treatment diary below  DC SLS  Assessment: Tolerated treatment well  Patient exhibited good technique with therapeutic exercises      Plan: Continue per plan of care           Precautions: None       Manuals 3/17 3/24 3/2 3/7 3/14   RLE  ds ds ds ds CD                 Neuro Re-Ed              SLS  3x 15" DC 3x 15" 3x 15" 3x 15 sec   Tandem Stance             Sidestepping w/TBand             Monster Walks w/TBand             Ther Ex             NuStep  10m L5 10m L5 10m L5 10m L5 10m L5   HR 30x 30x 30x 30x 30x    Leg Extension  10# 2/10 10#   2/10 10#  2/10 10# 2/10 Leg ext  10# 1/10   Hams Curl  35# 2/10 35#   2/10 35#  2/10 35# 2/10 Leg curl  30# 1/10   QSets  30x 3" 30x 3" 30x 3" 30x 3" 30x 3"   Bridges c add  2/15  2/15 2/15 2/15 2/15   SLR 2# 2/10 2# 2/10 2# 2/10 2# 2/10  2# 2/10   Supine hip Abd  L5 2/10 L5 2/10 L5 2/10 L4 2/10 L5  2/10   SAQ DC xxx 2# 2/10 2# 2/10 2# 2/10   Hip ABD/ADD  30# 2/10       Leg press  20# 2/10  20# 2/10  20# 2/10  20# 2/10 20# 1/10   Ther Activity             Gait Training             Modalities             CP  15m  15m 15m  15 m 15m

## 2022-03-28 ENCOUNTER — OFFICE VISIT (OUTPATIENT)
Dept: PHYSICAL THERAPY | Facility: CLINIC | Age: 57
End: 2022-03-28
Payer: COMMERCIAL

## 2022-03-28 DIAGNOSIS — M17.11 UNILATERAL PRIMARY OSTEOARTHRITIS, RIGHT KNEE: Primary | ICD-10-CM

## 2022-03-28 PROCEDURE — 97110 THERAPEUTIC EXERCISES: CPT

## 2022-03-28 PROCEDURE — 97140 MANUAL THERAPY 1/> REGIONS: CPT

## 2022-03-28 PROCEDURE — 97112 NEUROMUSCULAR REEDUCATION: CPT

## 2022-03-28 NOTE — PROGRESS NOTES
Daily Note     Today's date: 3/28/2022  Patient name: Satish Murrieta  : 1965  MRN: [id number]  Referring provider: Abbie Pace DO  Dx:   Encounter Diagnosis     ICD-10-CM    1  Unilateral primary osteoarthritis, right knee  M17 11                   Subjective: Patient states she feels she is ready for d/c at the end of the week  Objective: See treatment diary below      Assessment: Tolerated treatment well  Patient would benefit from continued PT to reach final goals for d/c  Patient was able to progress through exercise nicely with no increased pain  We did discuss the need for continued exercise for the BLE to continue to work on strength  She does fatigue with the SLR  Plan: Continue per plan of care           Precautions: None       Manuals 3/17 3/24 3/28 3/7 3/14   RLE  ds ds CD ds CD                 Neuro Re-Ed              SLS  3x 15" DC 3x 15" 3x 15" 3x 15 sec   Tandem Stance             Sidestepping w/TBand             Monster Walks w/TBand             Ther Ex             NuStep  10m L5 10m L5 10m L5 10m L5 10m L5   HR 30x 30x 30x 30x 30x    Leg Extension  10# 2/10 10#   2/10 10#  2/10 10# 2/10 Leg ext  10# 1/10   Hams Curl  35# 2/10 35#   2/10 35#  2/10 35# 2/10 Leg curl  30# 1/10   QSets  30x 3" 30x 3" 30x 3" 30x 3" 30x 3"   Bridges c add  2/15  2/15 2/15 2/15 2/15   SLR 2# 2/10 2# 2/10 2# 2/10 2# 2/10  2# 2/10   Supine hip Abd  L5 2/10 L5 2/10 L5 2/10 L4 2/10 L5  2/10   SAQ DC xxx 2# 2/10 2# 2/10 2# 2/10   Hip ABD/ADD  30# 2/10 30# 2/10      Leg press  20# 2/10  20# 2/10  20# 2/10  20# 2/10 20# 1/10   Ther Activity             Gait Training             Modalities             CP  15m  15m 15m  15 m 15m

## 2022-03-31 ENCOUNTER — OFFICE VISIT (OUTPATIENT)
Dept: PHYSICAL THERAPY | Facility: CLINIC | Age: 57
End: 2022-03-31
Payer: COMMERCIAL

## 2022-03-31 DIAGNOSIS — M17.11 UNILATERAL PRIMARY OSTEOARTHRITIS, RIGHT KNEE: Primary | ICD-10-CM

## 2022-03-31 PROCEDURE — 97110 THERAPEUTIC EXERCISES: CPT

## 2022-03-31 PROCEDURE — 97140 MANUAL THERAPY 1/> REGIONS: CPT

## 2022-03-31 PROCEDURE — 97112 NEUROMUSCULAR REEDUCATION: CPT

## 2022-03-31 NOTE — PROGRESS NOTES
Daily Note     Today's date: 3/31/2022  Patient name: Danielle Turcios  : 1965  MRN: [id number]  Referring provider: Brett Mtz DO  Dx:   Encounter Diagnosis     ICD-10-CM    1  Unilateral primary osteoarthritis, right knee  M17 11        Start Time: 1645  Stop Time: 174  Total time in clinic (min): 60 minutes    Subjective: Pt notes min c/o today  Objective: See treatment diary below      Assessment: Tolerated treatment well  Patient exhibited good technique with therapeutic exercises      Plan: Continue per plan of care           Precautions: None       Manuals 3/17 3/24 3/28 3/31 3/14   RLE  ds ds CD ds CD                 Neuro Re-Ed              Ther Ex             NuStep  10m L5 10m L5 10m L5 10m L5 10m L5   HR 30x 30x 30x 30x 30x    Leg Extension  10# 2/10 10#   2/10 10#  2/10 10# 2/10 Leg ext  10# 1/10   Hams Curl  35# 2/10 35#   2/10 35#  2/10 35# 2/10 Leg curl  30# 1/10   QSets  30x 3" 30x 3" 30x 3" 30x 3" 30x 3"   Bridges c add  2/15  2/15 2/15 2/15 2/15   SLR 2# 2/10 2# 2/10 2# 2/10 2# 2/10  2# 2/10   Supine hip Abd  L5 2/10 L5 2/10 L5 2/10 L4 2/10 L5  2/10   Hip ABD/ADD  30# 2/10 30# 2/10 30# 2/10     Leg press  20# 2/10  20# 2/10  20# 2/10  20# 2/10 20# 1/10   Ther Activity             Gait Training             Modalities             CP  15m  15m 15m  15 m 15m

## 2022-04-06 NOTE — PROGRESS NOTES
ADDENDUM  - 4/6/22  Patient is appropriate for d/c to HEP at this time  Patient has met goals and feels she is doing well overall  She plans to continue with self directed exercise